# Patient Record
Sex: FEMALE | Race: BLACK OR AFRICAN AMERICAN | URBAN - METROPOLITAN AREA
[De-identification: names, ages, dates, MRNs, and addresses within clinical notes are randomized per-mention and may not be internally consistent; named-entity substitution may affect disease eponyms.]

---

## 2021-12-09 ENCOUNTER — OFFICE VISIT (OUTPATIENT)
Dept: URBAN - METROPOLITAN AREA CLINIC 74 | Facility: CLINIC | Age: 46
End: 2021-12-09
Payer: COMMERCIAL

## 2021-12-09 ENCOUNTER — LAB OUTSIDE AN ENCOUNTER (OUTPATIENT)
Dept: URBAN - METROPOLITAN AREA CLINIC 74 | Facility: CLINIC | Age: 46
End: 2021-12-09

## 2021-12-09 ENCOUNTER — WEB ENCOUNTER (OUTPATIENT)
Dept: URBAN - METROPOLITAN AREA CLINIC 74 | Facility: CLINIC | Age: 46
End: 2021-12-09

## 2021-12-09 VITALS
HEART RATE: 81 BPM | HEIGHT: 62 IN | DIASTOLIC BLOOD PRESSURE: 80 MMHG | BODY MASS INDEX: 31.8 KG/M2 | WEIGHT: 172.8 LBS | SYSTOLIC BLOOD PRESSURE: 100 MMHG | OXYGEN SATURATION: 100 % | TEMPERATURE: 97.6 F

## 2021-12-09 DIAGNOSIS — Z12.11 COLON CANCER SCREENING: ICD-10-CM

## 2021-12-09 PROCEDURE — 99202 OFFICE O/P NEW SF 15 MIN: CPT | Performed by: INTERNAL MEDICINE

## 2021-12-09 RX ORDER — RIMEGEPANT SULFATE 75 MG/75MG
1 TABLET ON THE TONGUE AND ALLOW TO DISSOLVE TABLET, ORALLY DISINTEGRATING ORAL
Status: ACTIVE | COMMUNITY

## 2021-12-09 RX ORDER — POLYETHYLENE GLYCOL 3350, SODIUM SULFATE, SODIUM CHLORIDE, POTASSIUM CHLORIDE, ASCORBIC ACID, SODIUM ASCORBATE 140-9-5.2G
AS DIRECTED KIT ORAL ONCE
Qty: 1 | Refills: 0 | OUTPATIENT
Start: 2021-12-09 | End: 2021-12-10

## 2021-12-09 NOTE — HPI-TODAY'S VISIT:
Ms. Colón is a 46-year-old black female presenting for new patient evaluation.  Patient is presenting for colon cancer screening.  She was last seen in our office in 2016 due to issues with abdominal pain and change in bowel habits.  Work-up at that time included an EGD significant for small hiatal hernia and gastritis.  Colonoscopy with random biopsies were unremarkable.  She was found to ultimately have biliary dyskinesia and had a cholecystectomy where her symptoms resolved.  She now presenting for average risk colon cancer screening.  There is no known family history of colon cancer.  She is moving her bowels daily.  There is no blood in the stool.  She denies any abdominal pain or nausea.  She denies any GERD. She recently was seen in the emergency room in September for syncopal episode.  She was seen by Dr. Biggs in cardiology and had an event monitor.  She was told no cardiac pathology was found.

## 2022-03-10 ENCOUNTER — OFFICE VISIT (OUTPATIENT)
Dept: URBAN - METROPOLITAN AREA SURGERY CENTER 30 | Facility: SURGERY CENTER | Age: 47
End: 2022-03-10
Payer: COMMERCIAL

## 2022-03-10 DIAGNOSIS — D12.3 ADENOMA OF TRANSVERSE COLON: ICD-10-CM

## 2022-03-10 PROCEDURE — 45385 COLONOSCOPY W/LESION REMOVAL: CPT | Performed by: INTERNAL MEDICINE

## 2022-03-10 PROCEDURE — G8907 PT DOC NO EVENTS ON DISCHARG: HCPCS | Performed by: INTERNAL MEDICINE

## 2022-03-31 ENCOUNTER — OFFICE VISIT (OUTPATIENT)
Dept: URBAN - METROPOLITAN AREA CLINIC 74 | Facility: CLINIC | Age: 47
End: 2022-03-31
Payer: COMMERCIAL

## 2022-03-31 DIAGNOSIS — D12.6 BENIGN NEOPLASM OF COLON, UNSPECIFIED: ICD-10-CM

## 2022-03-31 DIAGNOSIS — K64.9 HEMORRHOID: ICD-10-CM

## 2022-03-31 PROCEDURE — 99213 OFFICE O/P EST LOW 20 MIN: CPT | Performed by: INTERNAL MEDICINE

## 2022-03-31 RX ORDER — RIMEGEPANT SULFATE 75 MG/75MG
1 TABLET ON THE TONGUE AND ALLOW TO DISSOLVE TABLET, ORALLY DISINTEGRATING ORAL
Status: ACTIVE | COMMUNITY

## 2022-03-31 NOTE — HPI-TODAY'S VISIT:
Ms. Colón presents to clinic for follow-up.  She was seen in December in need of colon cancer screening.  Colonoscopy was performed on March 10.  This was to the cecum.  She had one polyp removed from hepatic flexure that was a tubular adenoma.  Internal hemorrhoids were also noted.  Patient has had no issues since her colonoscopy.

## 2023-11-15 ENCOUNTER — DASHBOARD ENCOUNTERS (OUTPATIENT)
Age: 48
End: 2023-11-15

## 2023-11-15 ENCOUNTER — OFFICE VISIT (OUTPATIENT)
Dept: URBAN - METROPOLITAN AREA CLINIC 74 | Facility: CLINIC | Age: 48
End: 2023-11-15
Payer: COMMERCIAL

## 2023-11-15 VITALS
BODY MASS INDEX: 30.77 KG/M2 | DIASTOLIC BLOOD PRESSURE: 76 MMHG | HEIGHT: 62 IN | TEMPERATURE: 98.1 F | HEART RATE: 74 BPM | SYSTOLIC BLOOD PRESSURE: 126 MMHG | WEIGHT: 167.2 LBS | OXYGEN SATURATION: 100 %

## 2023-11-15 DIAGNOSIS — K52.89 OTHER SPECIFIED NONINFECTIVE GASTROENTERITIS AND COLITIS: ICD-10-CM

## 2023-11-15 DIAGNOSIS — Z86.010 HISTORY OF COLON POLYPS: ICD-10-CM

## 2023-11-15 PROBLEM — 428283002: Status: ACTIVE | Noted: 2023-11-15

## 2023-11-15 PROCEDURE — 99214 OFFICE O/P EST MOD 30 MIN: CPT | Performed by: INTERNAL MEDICINE

## 2023-11-15 RX ORDER — DICYCLOMINE HYDROCHLORIDE 20 MG/1
1 TABLET TABLET ORAL THREE TIMES A DAY
Qty: 270 TABLET | Refills: 3 | OUTPATIENT
Start: 2023-11-15 | End: 2024-11-09

## 2023-11-15 RX ORDER — RIMEGEPANT SULFATE 75 MG/75MG
1 TABLET ON THE TONGUE AND ALLOW TO DISSOLVE TABLET, ORALLY DISINTEGRATING ORAL
Status: ACTIVE | COMMUNITY

## 2023-11-15 NOTE — HPI-TODAY'S VISIT:
Pt known to Dr. Ziegler. Colonoscopy screening 2022, small TA polyp removed, no colitis or IBD. Seen at ER last week 11/8/2023 for acute diarrhea and abd pain, Norovirus "going around at school." CT positive for colitis from TV colon to sigmoid colon c/w likely acute infectious colitis. CBC/CMP/UA/Preg/Covid negative.  Rx for Lomotil and Zofran and GI f/u recommended. She is now 95% better in her words, no diarrhea and pain resolved. No bleeding. Still "gurgly" stomach. =====  Narrative & ImpressionEXAM:  CT ABDOMEN/PELVIS WITH IV CONTRAST(CREATININE DRAW IF NEEDED) CLINICAL INDICATION:  Epigastric pain TECHNIQUE: Following the administration of IV contrast, helical axial images were obtained from the diaphragm to the symphysis pubis. Dose reduction techniques were utilized. CONTRAST DOSE:80 mL Isovue-[id number] COMPARISON:  7/19/2016 CT abdomen pelvis FINDINGS:   The visualized lung bases are clear. There is a small 1.2 cm cyst within the lateral segment left hepatic lobe posteriorly, larger than on the previous study when it measured 0.6 cm. The gallbladder is absent. There is no biliary dilatation. The spleen, pancreas, adrenal glands, and kidneys appear unremarkable.  No ascites is present. No pathologically enlarged abdominal or retroperitoneal adenopathy is present.  There is abnormal thickening of the colon from the level of the distal transverse to proximal sigmoid colon suggesting colitis.There is no bowel obstruction.A normal appendix is identified. Images through the pelvis reveal bilateral fallopian tube occlusion devices.There is heterogeneous fullness in the region of the vagina-cervix-lower uterine segment.No adnexal masses seen. There are no enlarged pelvic lymph nodes. The partially distended urinary bladder is without obvious abnormality. No aggressive osseous lesions are identified. IMPRESSION:.         . Findings of colitis extending from the distal transverse to proximal sigmoid colon. Heterogeneous fullness lower uterine segment-cervical-vagina region. Consider pelvic ultrasound for further evaluation. Released By: REGLA CARRANZA MD  11/8/2023 4:19 PM